# Patient Record
Sex: FEMALE | Race: WHITE
[De-identification: names, ages, dates, MRNs, and addresses within clinical notes are randomized per-mention and may not be internally consistent; named-entity substitution may affect disease eponyms.]

---

## 2018-07-28 ENCOUNTER — HOSPITAL ENCOUNTER (EMERGENCY)
Dept: HOSPITAL 97 - ER | Age: 38
Discharge: HOME | End: 2018-07-28
Payer: SELF-PAY

## 2018-07-28 DIAGNOSIS — Z88.6: ICD-10-CM

## 2018-07-28 DIAGNOSIS — N39.0: Primary | ICD-10-CM

## 2018-07-28 DIAGNOSIS — F17.210: ICD-10-CM

## 2018-07-28 DIAGNOSIS — Z88.5: ICD-10-CM

## 2018-07-28 LAB
ALBUMIN SERPL BCP-MCNC: 3.5 G/DL (ref 3.4–5)
ALP SERPL-CCNC: 64 U/L (ref 45–117)
ALT SERPL W P-5'-P-CCNC: 14 U/L (ref 12–78)
AMYLASE SERPL-CCNC: 30 U/L (ref 25–115)
AST SERPL W P-5'-P-CCNC: 11 U/L (ref 15–37)
BUN BLD-MCNC: 7 MG/DL (ref 7–18)
GLUCOSE SERPLBLD-MCNC: 101 MG/DL (ref 74–106)
HCT VFR BLD CALC: 30.7 % (ref 36–45)
LIPASE SERPL-CCNC: 70 U/L (ref 73–393)
LYMPHOCYTES # SPEC AUTO: 0.5 K/UL (ref 0.7–4.9)
MCH RBC QN AUTO: 22.8 PG (ref 27–35)
MCV RBC: 71.6 FL (ref 80–100)
MORPHOLOGY BLD-IMP: (no result)
PMV BLD: 9.3 FL (ref 7.6–11.3)
POTASSIUM SERPL-SCNC: 3.7 MMOL/L (ref 3.5–5.1)
RBC # BLD: 4.28 M/UL (ref 3.86–4.86)
UA COMPLETE W REFLEX CULTURE PNL UR: (no result)

## 2018-07-28 PROCEDURE — 87077 CULTURE AEROBIC IDENTIFY: CPT

## 2018-07-28 PROCEDURE — 96375 TX/PRO/DX INJ NEW DRUG ADDON: CPT

## 2018-07-28 PROCEDURE — 82150 ASSAY OF AMYLASE: CPT

## 2018-07-28 PROCEDURE — 99284 EMERGENCY DEPT VISIT MOD MDM: CPT

## 2018-07-28 PROCEDURE — 85025 COMPLETE CBC W/AUTO DIFF WBC: CPT

## 2018-07-28 PROCEDURE — 96374 THER/PROPH/DIAG INJ IV PUSH: CPT

## 2018-07-28 PROCEDURE — 80048 BASIC METABOLIC PNL TOTAL CA: CPT

## 2018-07-28 PROCEDURE — 96361 HYDRATE IV INFUSION ADD-ON: CPT

## 2018-07-28 PROCEDURE — 80076 HEPATIC FUNCTION PANEL: CPT

## 2018-07-28 PROCEDURE — 71045 X-RAY EXAM CHEST 1 VIEW: CPT

## 2018-07-28 PROCEDURE — 81025 URINE PREGNANCY TEST: CPT

## 2018-07-28 PROCEDURE — 36415 COLL VENOUS BLD VENIPUNCTURE: CPT

## 2018-07-28 PROCEDURE — 81003 URINALYSIS AUTO W/O SCOPE: CPT

## 2018-07-28 PROCEDURE — 83690 ASSAY OF LIPASE: CPT

## 2018-07-28 PROCEDURE — 87186 SC STD MICRODIL/AGAR DIL: CPT

## 2018-07-28 PROCEDURE — 87088 URINE BACTERIA CULTURE: CPT

## 2018-07-28 PROCEDURE — 74177 CT ABD & PELVIS W/CONTRAST: CPT

## 2018-07-28 PROCEDURE — 81015 MICROSCOPIC EXAM OF URINE: CPT

## 2018-07-28 PROCEDURE — 87086 URINE CULTURE/COLONY COUNT: CPT

## 2018-07-28 NOTE — EDPHYS
Physician Documentation                                                                           

 [facility]                                                                

Name: Sunita Felipe                                                                             

Age: 37 yrs                                                                                       

Sex: Female                                                                                       

: 1980                                                                                   

MRN: [id number]                                                                                   

Arrival Date: 2018                                                                          

Time: 08:48                                                                                       

Account#: [id number]                                                                            

Bed 20                                                                                            

Private MD:                                                                                       

ED Physician Bhavik Bravo                                                                       

HPI:                                                                                              

                                                                                             

09:01 This 37 yrs old  Female presents to ER via Ambulatory with complaints of Flank rh1 

      Pain, Fever, InQuicker.                                                                     

09:01 The patient complains of pain in the left low back, left mid back, right mid back and   rh1 

      right low back. The pain does not radiate. Onset: The symptoms/episode began/occurred 3     

      day(s) ago. Modifying factors: The symptoms are alleviated by nothing. the symptoms are     

      aggravated by nothing. Associated signs and symptoms: Pertinent positives: dysuria,         

      fever, nausea, Pertinent negatives: dizziness, urinary frequency, pain radiating to the     

      lower extremities, vomiting. Severity of pain: At its worst the pain was moderate in        

      the emergency department the pain is unchanged. The patient has not experienced similar     

      symptoms in the past. The patient has not recently seen a physician. She began with         

      bilateral flank pain with dysuria 3 days ago. She took AZO, and pain resolved. Her          

      flank pain continues bilaterally, steadily getting worse. For the past 24 hours she has     

      been running subjective fevers at home. + nausea, denies any vomiting, denies any           

      urinary symptoms at this time..                                                             

                                                                                                  

OB/GYN:                                                                                           

11:00 LMP N/A - Birth control method                                                          em  

                                                                                                  

Historical:                                                                                       

- Allergies:                                                                                      

09:17 Darvocet-N 100;                                                                         ss  

09:17 Lortab;                                                                                 ss  

09:17 Codeine;                                                                                ss  

- Home Meds:                                                                                      

09:17 None [Active];                                                                          ss  

- PMHx:                                                                                           

09:17 None;                                                                                   ss  

- PSHx:                                                                                           

09:17 None;                                                                                   ss  

                                                                                                  

- Immunization history:: Adult Immunizations unknown.                                             

- Social history:: Smoking status: Patient uses tobacco products, smokes one pack                 

  cigarettes per day.                                                                             

- Ebola Screening: : Patient denies exposure to infectious person Patient denies travel           

  to an Ebola-affected area in the 21 days before illness onset.                                  

                                                                                                  

                                                                                                  

ROS:                                                                                              

09:01 Cardiovascular: Negative for chest pain, palpitations, and edema.                       rh1 

09:01 Constitutional: Positive for chills, fever, Negative for poor PO intake.                    

09:01 Respiratory: Positive for cough, Negative for hemoptysis, shortness of breath, wheezing.    

09:01 Abdomen/GI: Positive for nausea, Negative for abdominal pain, vomiting, diarrhea.           

09:01 Back: Positive for flank pain, Negative for decreased range of motion, pain with            

      movement, radiated pain.                                                                    

09:01 : Positive for burning with urination, 2 days ago, none today -- last AZO 2 days ago.     

09:01 MS/extremity: Negative for decreased range of motion, pain, paresthesias, swelling,         

      tenderness.                                                                                 

09:01 Neuro: Negative for altered mental status, dizziness, headache, numbness, near syncope,     

      tingling, weakness.                                                                         

09:01 All other systems are negative.                                                             

                                                                                                  

Exam:                                                                                             

09:01 Constitutional:  This is a well developed, well nourished patient who is awake, alert,  rh1 

      and in no acute distress. Head/Face:  Normocephalic, atraumatic. Neck:  Trachea             

      midline, and no cervical lymphadenopathy.  Supple, full range of motion without nuchal      

      rigidity.  No Meningismus. Chest/axilla:  Normal chest wall appearance and motion.          

      Nontender with no deformity.  No lesions are appreciated. Cardiovascular:  Regular rate     

      and rhythm with a normal S1 and S2.  No gallops, murmurs, or rubs.  No JVD.  No pulse       

      deficits. Respiratory:  Lungs have equal breath sounds bilaterally, clear to                

      auscultation.  No rales, rhonchi or wheezes noted.  No increased work of breathing.         

      Abdomen/GI:  Soft, non-tender, with normal bowel sounds.  No distension.  No guarding       

      or rebound.  No evidence of tenderness throughout.                                          

09:01 Skin:  Warm, dry with normal turgor.  Normal color with no rashes, no lesions, and no       

      evidence of cellulitis. MS/ Extremity:  Pulses equal, no cyanosis.  Neurovascular           

      intact.  Full, normal range of motion.                                                      

09:01 Back: pain, that is moderate, of the  left low back, left mid back, right mid back and      

      right low back, ROM is normal, painless, CVA tenderness, that is moderate, is noted         

      bilaterally, muscle spasm, is not present.                                                  

09:01 Neuro: Orientation: is normal, to person, place \T\ time. Mentation: is normal, lucid,      

      able to follow commands, Motor: is normal, moves all fours, Sensation: is normal, no        

      obvious gross deficits, numbness, is not appreciated, tingling, is not appreciated,         

      Gait: is steady, at a normal pace, without difficulty.                                      

                                                                                                  

Vital Signs:                                                                                      

09:17  / 78; Pulse 97; Resp 16; Temp 99.3(TE); Pulse Ox 98% on R/A; Height 5 ft. 6 in.  ss  

      (167.64 cm); Pain 10/10;                                                                    

10:07  / 61; Pulse 72; Resp 18; Pulse Ox 98% on R/A;                                    em  

11:00  / 64; Pulse 64; Resp 20; Pulse Ox 100% on R/A;                                   aj  

                                                                                                  

MDM:                                                                                              

09:01 Patient medically screened.                                                             rh1 

10:26 Data reviewed: vital signs, nurses notes, lab test result(s), radiologic studies, CT    rh1 

      scan, and as a result, I will discharge patient. Data interpreted: Pulse oximetry: on       

      room air is 98 %. Interpretation: normal. Counseling: I had a detailed discussion with      

      the patient and/or guardian regarding: the historical points, exam findings, and any        

      diagnostic results supporting the discharge/admit diagnosis, lab results, radiology         

      results, the need for outpatient follow up, a family practitioner, to return to the         

      emergency department if symptoms worsen or persist or if there are any questions or         

      concerns that arise at home.                                                                

                                                                                                  

                                                                                             

09:18 Order name: Amylase, Serum; Complete Time: 10:03                                        [facility] 

                                                                                             

09:18 Order name: Basic Metabolic Panel; Complete Time: 10:03                                 [facility] 

                                                                                             

09:18 Order name: CBC with Diff; Complete Time: 15:59                                         [facility] 

                                                                                             

09:18 Order name: Creatinine for Radiology; Complete Time: 10:03                              [facility] 

                                                                                             

09:18 Order name: Hepatic Function; Complete Time: 10:03                                      [facility] 

                                                                                             

09:18 Order name: Lipase; Complete Time: 10:03                                                [facility] 

                                                                                             

09:18 Order name: Urine Microscopic Only; Complete Time: 09:42                                [facility] 

                                                                                             

09:18 Order name: CT Abd/Pelvis - W/Contrast; Complete Time: 10:22                            [facility] 

                                                                                             

09:20 Order name: Urine Dipstick--Ancillary (enter results); Complete Time: 09:38               

                                                                                             

09:20 Order name: Urine Pregnancy--Ancillary (enter results); Complete Time: 09:38              

                                                                                             

09:22 Order name: Chest Single View XRAY; Complete Time: 15:59                                [facility] 

                                                                                             

09:42 Order name: Urine Culture                                                               [facility]

                                                                                             

10:08 Order name: Manual Differential; Complete Time: 15:59                                   [facility]

                                                                                             

09:07 Order name: Urine Dipstick-Ancillary (obtain specimen); Complete Time: 09:17            [facility] 

                                                                                             

09:07 Order name: Urine Pregnancy Test (obtain specimen); Complete Time: 09:17                [facility] 

                                                                                             

09:18 Order name: IV Saline Lock; Complete Time: 09:36                                        [facility] 

                                                                                             

09:18 Order name: Labs collected and sent; Complete Time: 09:36                               [facility] 

                                                                                                  

Administered Medications:                                                                         

09:37 Drug: Ketorolac 30 mg Route: IVP; Site: right antecubital;                              ss  

10:30 Follow up: Response: No adverse reaction; Pain is decreased                             em  

09:41 Drug: NS 0.9% 1000 ml Route: IV; Rate: 1000 ml; Site: right antecubital;                em  

10:30 Follow up: IV Status: Completed infusion; IV Intake: 1000ml                             em  

10:54 Drug: Rocephin - (cefTRIAXone) 1 grams Route: IVPB; Infused Over: 30 mins; Site: right  aj  

      antecubital;                                                                                

11:01 Follow up: Response: No adverse reaction; IV Status: Completed infusion; IV Intake: 25mlaj  

                                                                                                  

                                                                                                  

Disposition:                                                                                      

15:56 Co-signature as Attending Physician, Bhavik Bravo MD I agree with the assessment and   kdr 

      plan of care.                                                                               

                                                                                                  

Disposition:                                                                                      

18 10:25 Discharged to Home. Impression: Urinary tract infection, site not specified.       

- Condition is Stable.                                                                            

- Discharge Instructions: Urinary Tract Infection, Adult.                                         

- Prescriptions for Macrobid 100 mg Oral Capsule - take 1 capsule by ORAL route every             

  12 hours for 7 days; 14 capsule.                                                                

- Medication Reconciliation Form, Thank You Letter, Antibiotic Education, Prescription            

  Opioid Use form.                                                                                

- Follow up: Private Physician; When: 1 - 2 days; Reason: Recheck today's complaints,             

  Continuance of care, Re-evaluation by your physician. Follow up: Emergency                      

  Department; When: As needed; Reason: Fever > 102 F, If symptoms return, Trouble                 

  breathing, Worsening of condition.                                                              

- Problem is new.                                                                                 

- Symptoms have improved.                                                                         

                                                                                                  

                                                                                                  

                                                                                                  

Signatures:                                                                                       

Dispatcher [employer]                           Sofiya Medina, RN                       RN   Bhavik Wu MD MD kdr Munoz, Edgar, LVN                       LVN  Mackenzie Romano, PRANAY                      RN   ss                                                   

Samantha Babcock, CHRISSIE                       NP   rh1                                                  

                                                                                                  

Corrections: (The following items were deleted from the chart)                                    

11:01 10:25 2018 10:25 Discharged to Home. Impression: Urinary tract infection, site    aj  

      not specified. Condition is Stable. Forms are Medication Reconciliation Form, Thank You     

      Letter, Antibiotic Education, Prescription Opioid Use. Follow up: Private Physician;        

      When: 1 - 2 days; Reason: Recheck today's complaints, Continuance of care,                  

      Re-evaluation by your physician. Follow up: Emergency Department; When: As needed;          

      Reason: Fever > 102 F, If symptoms return, Trouble breathing, Worsening of condition.       

      Problem is new. Symptoms have improved. rh1                                                 

                                                                                                  

**************************************************************************************************

## 2018-07-28 NOTE — RAD REPORT
EXAM DESCRIPTION:  RAD - Chest Single View - 7/28/2018 9:50 am

 

CLINICAL HISTORY:  COUGH

Chest pain.

 

COMPARISON:  No comparisons

 

FINDINGS:  Portable technique limits examination quality.

 

The lungs are grossly clear. The heart is normal in size. No displaced fractures.

 

IMPRESSION:  No acute intrathoracic process suspected.

## 2018-07-28 NOTE — RAD REPORT
EXAM DESCRIPTION:  CTAbdomen   Pelvis W Contrast - 7/28/2018 9:57 am

 

CLINICAL HISTORY:  Abdominal pain.

IV only;Abd pain;Flank pain

 

COMPARISON:  No comparisons

 

TECHNIQUE:  Biphasic CT imaging of the abdomen and pelvis was performed with 100 ml non-ionic IV cont
rast.

 

All CT scans are performed using dose optimization technique as appropriate and may include automated
 exposure control or mA/KV adjustment according to patient size.

 

FINDINGS:  The lung bases are clear.

 

The liver, spleen, pancreas, adrenal glands and kidneys are within normal limits.

 

No bowel obstruction, free air, free fluid or abscess.  The appendix is normal.  No evidence of signi
ficant lymphadenopathy.

 

Mild mucosal thickening involves the urinary bladder. Mild enhancement of the uroepithelium of both u
reters is seen. This finding is most compatible with ascending urinary tract infection. No evidence o
f pyelonephritis at this time.

 

No suspicious bony findings.

 

IMPRESSION:  Cystitis pattern is identified with evidence of ascending urinary tract infection. No ev
idence of pyelonephritis at this time.

## 2018-07-28 NOTE — ER
Nurse's Notes                                                                                     

 [facility]                                                                

Name: Sunita Felipe                                                                             

Age: 37 yrs                                                                                       

Sex: Female                                                                                       

: 1980                                                                                   

MRN: [id number]                                                                                   

Arrival Date: 2018                                                                          

Time: 08:48                                                                                       

Account#: [id number]                                                                            

Bed 20                                                                                            

Private MD:                                                                                       

Diagnosis: Urinary tract infection, site not specified                                            

                                                                                                  

Presentation:                                                                                     

                                                                                             

08:55 Presenting complaint: Patient states: cloudy urine, mid back pain, lower abd pain that  ss  

      began 2 days ago with fever. Transition of care: patient was not received from another      

      setting of care. Onset of symptoms was 2018. Risk Assessment: Do you want to       

      hurt yourself or someone else? Patient reports no desire to harm self or others.            

      Initial Sepsis Screen: Does the patient meet any 2 criteria? HR > 90 bpm. Does the          

      patient have a suspected source of infection? Yes: Dysuria/Frequency/Urgency/UTI. Care      

      prior to arrival: None.                                                                     

08:55 Method Of Arrival: Ambulatory                                                             

08:55 Acuity: MANUEL 3                                                                           ss  

                                                                                                  

OB/GYN:                                                                                           

11:00 LMP N/A - Birth control method                                                          em  

                                                                                                  

Historical:                                                                                       

- Allergies:                                                                                      

09:17 Darvocet-N 100;                                                                         ss  

09:17 Lortab;                                                                                 ss  

09:17 Codeine;                                                                                ss  

- Home Meds:                                                                                      

09:17 None [Active];                                                                          ss  

- PMHx:                                                                                           

09:17 None;                                                                                   ss  

- PSHx:                                                                                           

09:17 None;                                                                                   ss  

                                                                                                  

- Immunization history:: Adult Immunizations unknown.                                             

- Social history:: Smoking status: Patient uses tobacco products, smokes one pack                 

  cigarettes per day.                                                                             

- Ebola Screening: : Patient denies exposure to infectious person Patient denies travel           

  to an Ebola-affected area in the 21 days before illness onset.                                  

                                                                                                  

                                                                                                  

Screening:                                                                                        

10:21 Abuse screen: Denies threats or abuse. Nutritional screening: No deficits noted.        em  

      Tuberculosis screening: No symptoms or risk factors identified. Fall Risk None              

      identified.                                                                                 

                                                                                                  

Assessment:                                                                                       

11:00 General: Appears in no apparent distress. uncomfortable, Behavior is calm, cooperative. em  

      Pain: Complains of pain in right low back and right mid back and left mid back and left     

      low back Pain currently is 10 out of 10 on a pain scale. Neuro: Level of Consciousness      

      is awake, alert, obeys commands, Oriented to person, place, time, situation.                

      Cardiovascular: Denies chest pain, Capillary refill < 3 seconds Patient's skin is warm      

      and dry. Respiratory: Airway is patent Respiratory effort is even, unlabored,               

      Respiratory pattern is regular, symmetrical. GI: Abdomen is flat, Reports nausea,           

      Patient currently denies pain, vomiting. : No signs and/or symptoms were reported         

      regarding the genitourinary system. EENT: No signs and/or symptoms were reported            

      regarding the EENT system. Derm: Skin is intact, Skin is pink, warm \T\ dry.                

      Musculoskeletal: Range of motion: intact in all extremities.                                

12:00 Reassessment: Patient appears in no apparent distress at this time. Patient and/or      em  

      family updated on plan of care and expected duration. Pain level reassessed. Patient is     

      alert, oriented x 3, equal unlabored respirations, skin warm/dry/pink. Patient states       

      feeling better. Patient states symptoms have improved.                                      

                                                                                                  

Vital Signs:                                                                                      

09:17  / 78; Pulse 97; Resp 16; Temp 99.3(TE); Pulse Ox 98% on R/A; Height 5 ft. 6 in.  ss  

      (167.64 cm); Pain 10/10;                                                                    

10:07  / 61; Pulse 72; Resp 18; Pulse Ox 98% on R/A;                                    em  

11:00  / 64; Pulse 64; Resp 20; Pulse Ox 100% on R/A;                                   aj  

                                                                                                  

ED Course:                                                                                        

08:48 Patient arrived in ED.                                                                  as  

08:51 Samantha Babcock NP is PHCP.                                                              rh1 

08:51 Bhavik Bravo MD is Attending Physician.                                              rh1 

09:04 Jose Maria Blair LVN is Primary Nurse.                                                     em  

09:17 Triage completed.                                                                       ss  

09:17 Urine collected: clean catch specimen, clear, dung colored.                            dh3 

09:17 Arm band placed on right wrist.                                                         ss  

09:33 Initial lab(s) drawn, sent to lab. by nursing student Mitra Newsome. Inserted     dh3 

      saline lock: 20 gauge in right antecubital area, using aseptic technique. Blood             

      collected.                                                                                  

09:49 Chest Single View XRAY In Process Unspecified.                                          EDMS

09:53 CT completed. Patient moved to CT via wheelchair. Patient moved back from CT.           cw1 

09:57 CT Abd/Pelvis - W/Contrast In Process Unspecified.                                      EDMS

10:21 Patient has correct armband on for positive identification. Bed in low position. Call   em  

      light in reach. Adult w/ patient.                                                           

11:00 No provider procedures requiring assistance completed. IV discontinued, intact,         aj  

      bleeding controlled, No redness/swelling at site. Pressure dressing applied.                

                                                                                                  

Administered Medications:                                                                         

09:37 Drug: Ketorolac 30 mg Route: IVP; Site: right antecubital;                                

10:30 Follow up: Response: No adverse reaction; Pain is decreased                             em  

09:41 Drug: NS 0.9% 1000 ml Route: IV; Rate: 1000 ml; Site: right antecubital;                em  

10:30 Follow up: IV Status: Completed infusion; IV Intake: 1000ml                             em  

10:54 Drug: Rocephin - (cefTRIAXone) 1 grams Route: IVPB; Infused Over: 30 mins; Site: right  aj  

      antecubital;                                                                                

11:01 Follow up: Response: No adverse reaction; IV Status: Completed infusion; IV Intake: 25mlaj  

                                                                                                  

                                                                                                  

Intake:                                                                                           

10:30 IV: 1000ml; Total: 1000ml.                                                              em  

11:01 IV: 25ml; Total: 1025ml.                                                                  

                                                                                                  

Outcome:                                                                                          

10:25 Discharge ordered by MD.                                                                1 

11:00 Discharged to home ambulatory, with family.                                             aj  

11:00 Condition: good                                                                             

11:00 Discharge instructions given to patient, family, Instructed on discharge instructions,      

      follow up and referral plans. medication usage, Demonstrated understanding of               

      instructions, follow-up care, medications, Prescriptions given X 1.                         

11:01 Patient left the ED.                                                                      

                                                                                                  

Signatures:                                                                                       

Dispatcher [employer]                           Sofiya Medina, RN                       Jose Maria Hampton, LVN                       LVN  Maggi Cortez Shelby, RN RN ss Woodley, Crystal cw1                                                  

Samantha Babcock NP                       NP   [facility]                                                  

Jazmine Mcintosh                              [facility]                                                  

                                                                                                  

**************************************************************************************************

## 2018-10-23 ENCOUNTER — HOSPITAL ENCOUNTER (EMERGENCY)
Dept: HOSPITAL 97 - ER | Age: 38
Discharge: LEFT BEFORE DISCHARGE | End: 2018-10-23
Payer: COMMERCIAL

## 2018-10-23 DIAGNOSIS — Z88.5: ICD-10-CM

## 2018-10-23 DIAGNOSIS — F17.210: ICD-10-CM

## 2018-10-23 DIAGNOSIS — Z88.6: ICD-10-CM

## 2018-10-23 DIAGNOSIS — R07.9: Primary | ICD-10-CM

## 2018-10-23 LAB
ALBUMIN SERPL BCP-MCNC: 4.1 G/DL (ref 3.4–5)
ALP SERPL-CCNC: 63 U/L (ref 45–117)
ALT SERPL W P-5'-P-CCNC: 19 U/L (ref 12–78)
AST SERPL W P-5'-P-CCNC: 14 U/L (ref 15–37)
BUN BLD-MCNC: 8 MG/DL (ref 7–18)
GLUCOSE SERPLBLD-MCNC: 86 MG/DL (ref 74–106)
HCT VFR BLD CALC: 34.5 % (ref 36–45)
HDLC SERPL-MCNC: 91 MG/DL (ref 40–60)
INR BLD: 0.97
LDLC SERPL CALC-MCNC: 99 MG/DL (ref ?–130)
LYMPHOCYTES # SPEC AUTO: 1.5 K/UL (ref 0.7–4.9)
MAGNESIUM SERPL-MCNC: 2.2 MG/DL (ref 1.8–2.4)
MCH RBC QN AUTO: 23.1 PG (ref 27–35)
MCV RBC: 72.5 FL (ref 80–100)
NT-PROBNP SERPL-MCNC: 65 PG/ML (ref ?–125)
PMV BLD: 9 FL (ref 7.6–11.3)
POTASSIUM SERPL-SCNC: 3.6 MMOL/L (ref 3.5–5.1)
RBC # BLD: 4.76 M/UL (ref 3.86–4.86)
TROPONIN (EMERG DEPT USE ONLY): < 0.02 NG/ML (ref 0–0.04)

## 2018-10-23 PROCEDURE — 99285 EMERGENCY DEPT VISIT HI MDM: CPT

## 2018-10-23 PROCEDURE — 84484 ASSAY OF TROPONIN QUANT: CPT

## 2018-10-23 PROCEDURE — 36415 COLL VENOUS BLD VENIPUNCTURE: CPT

## 2018-10-23 PROCEDURE — 80061 LIPID PANEL: CPT

## 2018-10-23 PROCEDURE — 83880 ASSAY OF NATRIURETIC PEPTIDE: CPT

## 2018-10-23 PROCEDURE — 85025 COMPLETE CBC W/AUTO DIFF WBC: CPT

## 2018-10-23 PROCEDURE — 80053 COMPREHEN METABOLIC PANEL: CPT

## 2018-10-23 PROCEDURE — 93005 ELECTROCARDIOGRAM TRACING: CPT

## 2018-10-23 PROCEDURE — 85610 PROTHROMBIN TIME: CPT

## 2018-10-23 PROCEDURE — 83735 ASSAY OF MAGNESIUM: CPT

## 2018-10-23 PROCEDURE — 71045 X-RAY EXAM CHEST 1 VIEW: CPT

## 2018-10-23 NOTE — ER
Nurse's Notes                                                                                     

 [facility]                                                                

Name: Snuita Felipe                                                                             

Age: 37 yrs                                                                                       

Sex: Female                                                                                       

: 1980                                                                                   

MRN: [id number]                                                                                   

Arrival Date: 10/23/2018                                                                          

Time: 10:40                                                                                       

Account#: [id number]                                                                            

Bed 6                                                                                             

Private MD: None, None                                                                            

Diagnosis: Chest pain, unspecified                                                                

                                                                                                  

Presentation:                                                                                     

10/23                                                                                             

10:44 Presenting complaint: Patient states: I have been having chest pain for the last 2      la1 

      days, I have been under a lot of stress and I have been attributing it to that but my       

      BP was high at home (147/117). Transition of care: patient was not received from            

      another setting of care. Onset of symptoms was 2018. Risk Assessment: Do        

      you want to hurt yourself or someone else? Patient reports no desire to harm self or        

      others. Initial Sepsis Screen: Does the patient meet any 2 criteria? No. Patient's          

      initial sepsis screen is negative. Does the patient have a suspected source of              

      infection? No. Patient's initial sepsis screen is negative. Care prior to arrival: None.    

10:44 Method Of Arrival: Ambulatory                                                           la1 

10:44 Acuity: MANUEL 3                                                                           la1 

                                                                                                  

OB/GYN:                                                                                           

11:36 LMP 10/1/2018                                                                           jl7 

                                                                                                  

Historical:                                                                                       

- Allergies:                                                                                      

10:46 Codeine;                                                                                la1 

10:46 Darvocet-N 100;                                                                         la1 

10:46 Lortab;                                                                                 la1 

- PMHx:                                                                                           

10:46 None;                                                                                   la1 

                                                                                                  

- Immunization history:: Adult Immunizations up to date.                                          

- Social history:: Smoking status: Patient uses tobacco products, smokes one pack                 

  cigarettes per day.                                                                             

- Ebola Screening: : No symptoms or risks identified at this time.                                

                                                                                                  

                                                                                                  

Screenin:35 Abuse screen: Denies threats or abuse. Denies injuries from another. Nutritional        jl7 

      screening: No deficits noted. Tuberculosis screening: No symptoms or risk factors           

      identified. Fall Risk IV access (20 points). Total Kimble Fall Scale indicates No Risk       

      (0-24 pts).                                                                                 

                                                                                                  

Assessment:                                                                                       

11:15 General: Appears in no apparent distress. uncomfortable, Behavior is calm, cooperative, jl7 

      appropriate for age. Pain: Complains of pain in anterior aspect of left upper chest         

      Pain does not radiate. Pain currently is 7 out of 10 on a pain scale. Quality of pain       

      is described as sharp, Pain began 2-3 days ago. Is continuous. Neuro: Level of              

      Consciousness is awake, alert, obeys commands, Oriented to person, place, time,             

      situation. Cardiovascular: Heart tones S1 S2 present Patient's skin is warm and dry.        

      Respiratory: Airway is patent Respiratory effort is even, unlabored, Respiratory            

      pattern is regular, symmetrical. GI: No signs and/or symptoms were reported involving       

      the gastrointestinal system. Derm: Skin is pink, warm \T\ dry.                              

12:25 Reassessment: Patient appears in no apparent distress at this time. Patient states that aj1 

      she has decided that she does not want to be admitted, states she is going to leave but     

      will come back if her chest pain gets worse. Dr. Azul notified. AMA paper signed.         

                                                                                                  

Vital Signs:                                                                                      

10:46  / 75; Pulse 52; Resp 16; Temp 97.6(O); Pulse Ox 98% on R/A; Weight 65.77 kg;     la1 

      Height 5 ft. 6 in. (167.64 cm);                                                             

11:11  / 68; Pulse 66; Resp 12 S; Pulse Ox 100% on R/A; Pain 7/10;                      jl7 

10:46 Body Mass Index 23.40 (65.77 kg, 167.64 cm)                                             la1 

                                                                                                  

ED Course:                                                                                        

10:40 Patient arrived in ED.                                                                  mr  

10:41 None, None is Private Physician.                                                        mr  

10:45 Triage completed.                                                                       la1 

10:47 Arm band placed on left wrist.                                                          la1 

10:48 Jesús Azul MD is Attending Physician.                                             ps1 

10:50 Simi Aceves, PRANAY is Primary Nurse.                                                      jl7 

10:55 EKG done, by EKG tech. reviewed by Jesús Azul MD.                                   at1 

11:20 Initial lab(s) drawn, by me, sent to lab. Inserted saline lock: 22 gauge in right       jl7 

      antecubital area, using aseptic technique. Blood collected.                                 

11:21 X-ray completed. Portable x-ray completed in exam room. Patient tolerated procedure     jb2 

      well.                                                                                       

11:24 XRAY Chest (1 view) In Process Unspecified.                                             EDMS

11:35 Patient has correct armband on for positive identification. Placed in gown. Bed in low  jl7 

      position. Call light in reach. Side rails up X 1. Cardiac monitor on. Pulse ox on. NIBP     

      on. Warm blanket given.                                                                     

11:35 Patient maintains SpO2 saturation greater than 95% on room air.                         jl7 

12:07 Shankar Sam MD is Referral Physician.                                               ps1 

12:22 Shankar Sam MD is Referral Physician.                                               ps1 

12:26 No provider procedures requiring assistance completed. IV discontinued, intact,         aj1 

      bleeding controlled, No redness/swelling at site. Pressure dressing applied.                

                                                                                                  

Administered Medications:                                                                         

No medications were administered                                                                  

                                                                                                  

                                                                                                  

Outcome:                                                                                          

12: Discharge ordered by MD.                                                                ps1 

12:27 AMA AMA form signed                                                                     aj1 

12:27 Condition: good                                                                             

12:27 Discharge instructions given to patient, Instructed on risks of leaving AMA, need to        

      rule out cardiac cause of chest pain, return immediately if chest pain persists or gets     

      worse or for any concerns.                                                                  

12:28 Patient left the ED.                                                                    aj1 

                                                                                                  

Signatures:                                                                                       

Dispatcher [employer]                           EDMS                                                 

Odette Miranda RN RN   aj1                                                  

CarballoRoma                                 mr FlorezQuinn                              jb2                                                  

Sofiya Lerner, EKG Tech              EKG Tat1                                                  

Jose Bernard RN RN   laSimi Akbar RN RN   jl7                                                  

Jesús Azul MD MD   ps1                                                  

                                                                                                  

**************************************************************************************************

## 2018-10-23 NOTE — EKG
Test Date:    2018-10-23               Test Time:    10:54:01

Technician:   WON                                     

                                                     

MEASUREMENT RESULTS:                                       

Intervals:                                           

Rate:         46                                     

WV:           152                                    

QRSD:         90                                     

QT:           444                                    

QTc:          388                                    

Axis:                                                

P:            23                                     

WV:           152                                    

QRS:          45                                     

T:            25                                     

                                                     

INTERPRETIVE STATEMENTS:                                       

                                                     

Marked sinus bradycardia

Abnormal ECG

No previous ECG available for comparison



Electronically Signed On 10-23-18 12:09:47 CDT by Shankar Sam

## 2018-10-23 NOTE — RAD REPORT
EXAM DESCRIPTION:  RAD - Chest Single View - 10/23/2018 11:23 am

 

CLINICAL HISTORY:  CHEST PAIN

Chest pain.

 

COMPARISON:  Chest Single View dated 7/28/2018

 

FINDINGS:  Portable technique limits examination quality.

 

The lungs are grossly clear. The heart is normal in size. No displaced fractures.

 

IMPRESSION:  No acute intrathoracic process suspected.

## 2018-10-23 NOTE — EDPHYS
Physician Documentation                                                                           

 [facility]                                                                

Name: Sunita Felipe                                                                             

Age: 37 yrs                                                                                       

Sex: Female                                                                                       

: 1980                                                                                   

MRN: [id number]                                                                                   

Arrival Date: 10/23/2018                                                                          

Time: 10:40                                                                                       

Account#: [id number]                                                                            

Bed 6                                                                                             

Private MD: None, None                                                                            

ED Physician Jesús Azul                                                                      

HPI:                                                                                              

10/23                                                                                             

11:21 This 37 yrs old  Female presents to ER via Ambulatory with complaints of Chest ps1 

      Pain, High Blood Pressure.                                                                  

11:21 patient states that she has had a couple of episodes of chest pain over the last couple ps1 

      of days. Pain is intermittent and associated with stress. Localized substernal, no          

      radiation. Rated moderate to severe. "like someone punching me in chest". She is            

      recently  from significant other. FH of CAD. Started smoking in last 6 months.     

      .                                                                                           

                                                                                                  

OB/GYN:                                                                                           

11:36 LMP 10/1/2018                                                                           jl7 

                                                                                                  

Historical:                                                                                       

- Allergies:                                                                                      

10:46 Codeine;                                                                                la1 

10:46 Darvocet-N 100;                                                                         la1 

10:46 Lortab;                                                                                 la1 

- PMHx:                                                                                           

10:46 None;                                                                                   la1 

                                                                                                  

- Immunization history:: Adult Immunizations up to date.                                          

- Social history:: Smoking status: Patient uses tobacco products, smokes one pack                 

  cigarettes per day.                                                                             

- Ebola Screening: : No symptoms or risks identified at this time.                                

                                                                                                  

                                                                                                  

ROS:                                                                                              

11:21 Constitutional: Negative for fever, chills, and weight loss, Eyes: Negative for injury, ps1 

      pain, redness, and discharge, Respiratory: Negative for shortness of breath, cough,         

      wheezing, and pleuritic chest pain, Abdomen/GI: Negative for abdominal pain, nausea,        

      vomiting, diarrhea, and constipation, Back: Negative for injury and pain, MS/Extremity:     

      Negative for injury and deformity, Skin: Negative for injury, rash, and discoloration,      

      Neuro: Negative for headache, weakness, numbness, tingling, and seizure.                    

11:21 Cardiovascular: Positive for chest pain.                                                    

11:21 Psych: Positive for anxiety.                                                                

                                                                                                  

Exam:                                                                                             

11:21 Constitutional:  This is a well developed, well nourished patient who is awake, alert,  ps1 

      and in no acute distress. Head/Face:  Normocephalic, atraumatic. Eyes:  Pupils equal        

      round and reactive to light, extra-ocular motions intact.  Lids and lashes normal.          

      Conjunctiva and sclera are non-icteric and not injected. Chest/axilla:  Normal chest        

      wall appearance and motion.  Nontender with no deformity.  No lesions are appreciated.      

      Cardiovascular:  Regular rate and rhythm.  No gallops, murmurs, or rubs.  Normal PMI,       

      no JVD.  No pulse deficits. Respiratory:  Lungs have equal breath sounds bilaterally,       

      clear to auscultation and percussion.  No rales, rhonchi or wheezes noted.  No              

      increased work of breathing, no retractions or nasal flaring. Abdomen/GI:  Soft,            

      non-tender, with normal bowel sounds.  No distension or tympany.  No guarding or            

      rebound.  No evidence of tenderness throughout. MS/ Extremity:  Pulses equal, no            

      cyanosis.  Neurovascular intact.  Full, normal range of motion. Psych:  Awake, alert,       

      with orientation to person, place and time.  Behavior, mood, and affect are within          

      normal limits.                                                                              

                                                                                                  

Vital Signs:                                                                                      

10:46  / 75; Pulse 52; Resp 16; Temp 97.6(O); Pulse Ox 98% on R/A; Weight 65.77 kg;     la1 

      Height 5 ft. 6 in. (167.64 cm);                                                             

11:11  / 68; Pulse 66; Resp 12 S; Pulse Ox 100% on R/A; Pain 7/10;                      jl7 

10:46 Body Mass Index 23.40 (65.77 kg, 167.64 cm)                                             la1 

                                                                                                  

MDM:                                                                                              

11:16 Patient medically screened.                                                             ps1 

12:04 HEART Score: History: Slightly Suspicious (0), ECG: Normal (0), Age: < or = 45 years    ps1 

      (0), Risk Factors: 1 or 2 risk factors (1), Troponin: < or = 1 x Normal Limit (0),          

      Total Score =. Data reviewed: vital signs, EMS record, lab test result(s), cardiac          

      enzymes, and as a result, I will discharge patient. Data interpreted: Cardiac monitor:      

      Pulse oximetry:. Test interpretation: by ED physician or midlevel provider: ECG.            

      Counseling: I had a detailed discussion with the patient and/or guardian regarding: the     

      need for outpatient follow up, a cardiologist. Special discussion: Based on the             

      patient's history, exam, and Dx evaluation, there is no indication for emergent             

      intervention or inpatient Tx. It is understood by the patient/guardian that if the Sx's     

      persist or worsen they need to return immediately for re-evaluation.                        

12:19 ED course: upon discussion with patient at discharge, she states that she is now having ps1 

      worsening chest pain. I explained that she would need to be placed in observation if        

      she is maintaining her level of pain or if it is worsening. Patient verbalized not          

      being able to stay. Discussed risks and benefits and patient at this point is going to      

      leave AMA. Patient is free to return. .                                                     

                                                                                                  

10/23                                                                                             

10:49 Order name: CBC with Diff                                                               ps1 

10/23                                                                                             

10:49 Order name: Magnesium                                                                   ps1 

10/23                                                                                             

10:49 Order name: NT PRO-BNP; Complete Time: 11:55                                            ps1 

10/23                                                                                             

10:49 Order name: PT-INR; Complete Time: 11:55                                                ps1 

10/23                                                                                             

10:49 Order name: Troponin (emerg Dept Use Only); Complete Time: 11:55                        ps1 

10/23                                                                                             

10:49 Order name: CMP; Complete Time: 11:55                                                   ps1 

10/23                                                                                             

10:49 Order name: XRAY Chest (1 view); Complete Time: 11:55                                   ps1 

10/23                                                                                             

10:49 Order name: EKG; Complete Time: 10:50                                                   ps1 

10/23                                                                                             

10:49 Order name: Cardiac monitoring; Complete Time: 11:01                                    ps1 

10/23                                                                                             

10:49 Order name: EKG - Nurse/Tech; Complete Time: 11:                                      ps1 

10/23                                                                                             

10:49 Order name: IV Saline Lock; Complete Time: 11:33                                        ps1 

10/23                                                                                             

10:50 Order name: CBC with Automated Diff; Complete Time: 11:55                               EDMS

10/23                                                                                             

10:50 Order name: Magnesium; Complete Time: 11:55                                             EDMS

10/23                                                                                             

12:08 Order name: Lipid Profile                                                               ps1 

10/23                                                                                             

10:49 Order name: Labs collected and sent; Complete Time: 11:33                               ps1 

10/23                                                                                             

10:49 Order name: O2 Per Protocol; Complete Time: 11:00                                       ps1 

10/23                                                                                             

10:49 Order name: O2 Sat Monitoring; Complete Time: 11:00                                     ps1 

                                                                                                  

ECG:                                                                                              

10:54 Rate is 46 beats/min. Rhythm is regular. QRS Axis is Normal. AK interval is normal. QRS ps1 

      interval is normal. QT interval is normal. No Q waves. T waves are Normal. No ST            

      changes noted. Clinical impression: Sinus bradycardia. Interpreted by me.                   

                                                                                                  

Administered Medications:                                                                         

No medications were administered                                                                  

                                                                                                  

                                                                                                  

Disposition:                                                                                      

10/23/18 12:23 Patient has left against medical advice. Impression: Chest pain, unspecified. -    

  Patients states they are going to Home.                                                         

- Condition is Fair.                                                                              

- Discharge Instructions: Nonspecific Chest Pain.                                                 

                                                                                                  

                                                                                                  

Follow up: Shankar Sam MD; When: Upon discharge from the Emergency Department;                

  Reason: Further diagnostic work-up, Recheck today's complaints, Re-evaluation by your           

  physician. Follow up: Emergency Department; When: As needed; Reason: Worsening of               

  condition, Further diagnostic work-up.                                                          

- Problem is new.                                                                                 

- Symptoms are unchanged.                                                                         

                                                                                                  

                                                                                                  

                                                                                                  

Signatures:                                                                                       

Dispatcher [employer]                           EDMS                                                 

Odette Miranda RN                     RN   aj1                                                  

Jose Bernard RN                         RN   la1                                                  

Jesús Azul MD MD   ps1                                                  

                                                                                                  

Corrections: (The following items were deleted from the chart)                                    

12:22 12:07 10/23/2018 12:07 Discharged to Home. Impression: Other chest pain. Condition is   ps1 

      Stable. Forms are Medication Reconciliation Form, Thank You Letter, Antibiotic              

      Education, Prescription Opioid Use. Follow up: Shankar Sam; When: As needed; Reason:      

      Recheck today's complaints, Continuance of care, Re-evaluation by your physician. ps1       

12:28 12:23 10/23/2018 12:23 Patients has left against medical advice. Impression: Chest      aj1 

      pain, unspecified. Patient states they are going to Home. Condition is Fair. Follow up:     

      Shankar Sam; When: Upon discharge from the Emergency Department; Reason: Further          

      diagnostic work-up, Recheck today's complaints, Re-evaluation by your physician. Follow     

      up: Emergency Department; When: As needed; Reason: Worsening of condition, Further          

      diagnostic work-up. Problem is new. Symptoms are unchanged. ps1                             

                                                                                                  

**************************************************************************************************